# Patient Record
Sex: FEMALE | Race: WHITE | URBAN - METROPOLITAN AREA
[De-identification: names, ages, dates, MRNs, and addresses within clinical notes are randomized per-mention and may not be internally consistent; named-entity substitution may affect disease eponyms.]

---

## 2019-04-10 ENCOUNTER — NEW PATIENT (OUTPATIENT)
Dept: URBAN - METROPOLITAN AREA CLINIC 51 | Facility: CLINIC | Age: 64
End: 2019-04-10
Payer: MEDICARE

## 2019-04-10 DIAGNOSIS — H52.4 PRESBYOPIA: ICD-10-CM

## 2019-04-10 DIAGNOSIS — H04.123 TEAR FILM INSUFFICIENCY OF BILATERAL LACRIMAL GLANDS: ICD-10-CM

## 2019-04-10 PROCEDURE — 92004 COMPRE OPH EXAM NEW PT 1/>: CPT | Performed by: OPTOMETRIST

## 2019-04-10 ASSESSMENT — INTRAOCULAR PRESSURE
OD: 10
OS: 11

## 2019-04-10 ASSESSMENT — VISUAL ACUITY
OS: 20/20
OD: 20/20

## 2019-04-10 ASSESSMENT — KERATOMETRY
OS: 44.65
OD: 44.55

## 2021-03-03 DIAGNOSIS — Z23 NEED FOR VACCINATION: ICD-10-CM

## 2021-07-01 ENCOUNTER — OFFICE VISIT (OUTPATIENT)
Dept: URBAN - METROPOLITAN AREA CLINIC 51 | Facility: CLINIC | Age: 66
End: 2021-07-01
Payer: MEDICARE

## 2021-07-01 DIAGNOSIS — D31.32 BENIGN NEOPLASM OF LEFT CHOROID: ICD-10-CM

## 2021-07-01 DIAGNOSIS — Z98.890 OTHER SPECIFIED POSTPROCEDURAL STATES: ICD-10-CM

## 2021-07-01 DIAGNOSIS — H43.313 VITREOUS MEMBRANES AND STRANDS, BILATERAL: ICD-10-CM

## 2021-07-01 DIAGNOSIS — H25.13 AGE-RELATED NUCLEAR CATARACT, BILATERAL: Primary | ICD-10-CM

## 2021-07-01 PROCEDURE — 92014 COMPRE OPH EXAM EST PT 1/>: CPT | Performed by: OPTOMETRIST

## 2021-07-01 PROCEDURE — 92134 CPTRZ OPH DX IMG PST SGM RTA: CPT | Performed by: OPTOMETRIST

## 2021-07-01 ASSESSMENT — KERATOMETRY
OS: 44.59
OD: 44.52

## 2021-07-01 ASSESSMENT — VISUAL ACUITY
OD: 20/20
OS: 20/20

## 2021-07-01 ASSESSMENT — INTRAOCULAR PRESSURE
OD: 11
OS: 11

## 2021-07-01 NOTE — IMPRESSION/PLAN
Impression: Benign neoplasm of left choroid: D31.32. Plan: 3, 1DD superior nasal nevus's. No orange pigment or elevation. Monitor annually with fundus photos.

## 2021-07-01 NOTE — IMPRESSION/PLAN
Impression: Age-related nuclear cataract, bilateral Plan: Minimal progression since last dilated exam. No treatment currently recommended due to level of vision. Patient will monitor vision changes and contact us with any decrease in vision, will re-evaluate cataracts on return visit.

## 2021-07-01 NOTE — IMPRESSION/PLAN
Impression: Tear film insufficiency of bilateral lacrimal glands Plan: Pt reports plugs in the past 
Continue AT's 1-2 times a day or more OU

## 2023-03-24 ENCOUNTER — OFFICE VISIT (OUTPATIENT)
Dept: URBAN - METROPOLITAN AREA CLINIC 51 | Facility: CLINIC | Age: 68
End: 2023-03-24
Payer: MEDICARE

## 2023-03-24 DIAGNOSIS — H25.13 AGE-RELATED NUCLEAR CATARACT, BILATERAL: Primary | ICD-10-CM

## 2023-03-24 DIAGNOSIS — H43.313 VITREOUS MEMBRANES AND STRANDS, BILATERAL: ICD-10-CM

## 2023-03-24 DIAGNOSIS — D31.32 BENIGN NEOPLASM OF LEFT CHOROID: ICD-10-CM

## 2023-03-24 DIAGNOSIS — Z98.890 OTHER SPECIFIED POSTPROCEDURAL STATES: ICD-10-CM

## 2023-03-24 PROCEDURE — 92014 COMPRE OPH EXAM EST PT 1/>: CPT | Performed by: OPTOMETRIST

## 2023-03-24 PROCEDURE — 92134 CPTRZ OPH DX IMG PST SGM RTA: CPT | Performed by: OPTOMETRIST

## 2023-03-24 ASSESSMENT — VISUAL ACUITY
OS: 20/20
OD: 20/20

## 2023-03-24 ASSESSMENT — INTRAOCULAR PRESSURE
OD: 12
OS: 12

## 2023-03-24 ASSESSMENT — KERATOMETRY
OD: 44.50
OS: 44.63

## 2023-03-24 NOTE — IMPRESSION/PLAN
Impression: Other specified postprocedural states: Z98.890. Plan: Hx of LASIK. Flaps clear and centered OU.

## 2023-03-24 NOTE — IMPRESSION/PLAN
Impression: Age-related nuclear cataract, bilateral Plan: Cataracts are mild and not visually significant. No treatment is currently warranted. Patient will monitor vision closely and contact our office with any changes/ worsening in vision. Will re-evaluate on return visit.
